# Patient Record
Sex: MALE
[De-identification: names, ages, dates, MRNs, and addresses within clinical notes are randomized per-mention and may not be internally consistent; named-entity substitution may affect disease eponyms.]

---

## 2022-07-29 ENCOUNTER — HOSPITAL ENCOUNTER (EMERGENCY)
Dept: HOSPITAL 46 - ED | Age: 32
LOS: 1 days | Discharge: HOME | End: 2022-07-30
Payer: SELF-PAY

## 2022-07-29 VITALS — BODY MASS INDEX: 34.53 KG/M2 | HEIGHT: 67 IN | WEIGHT: 220 LBS

## 2022-07-29 DIAGNOSIS — Z20.822: ICD-10-CM

## 2022-07-29 DIAGNOSIS — Z00.8: Primary | ICD-10-CM

## 2022-07-29 PROCEDURE — U0003 INFECTIOUS AGENT DETECTION BY NUCLEIC ACID (DNA OR RNA); SEVERE ACUTE RESPIRATORY SYNDROME CORONAVIRUS 2 (SARS-COV-2) (CORONAVIRUS DISEASE [COVID-19]), AMPLIFIED PROBE TECHNIQUE, MAKING USE OF HIGH THROUGHPUT TECHNOLOGIES AS DESCRIBED BY CMS-2020-01-R: HCPCS

## 2022-07-29 PROCEDURE — G0480 DRUG TEST DEF 1-7 CLASSES: HCPCS

## 2022-07-29 PROCEDURE — C9803 HOPD COVID-19 SPEC COLLECT: HCPCS

## 2022-07-29 NOTE — XMS
PreManage Notification: SUZE THOMPSON MRN:[id number]
 
Security Information
 
Security Events
No recent Security Events currently on file
 
 
 
CRITERIA MET
------------
- [facility] - 2 Visits in 30 Days
 
 
CARE PROVIDERS
There are no care providers on record at this time.
 
[facility] has no Care Guidelines for this patient.
 
[facility] V[facility] (12 MO.)
-------------------------------------------------------------------------------------
[facility]
-------------------------------------------------------------------------------------
TOTAL 2
-------------------------------------------------------------------------------------
NOTE: Visits indicate total known visits.
 
ED/C VISIT TRACKING (12 MO.)
-------------------------------------------------------------------------------------
07/29/2022 11:17
 [facility] [place]
 
TYPE: Emergency
 
COMPLAINT:
- MEDICAL CLEARANCE
-------------------------------------------------------------------------------------
07/27/2022 12:09
[facility] [place]
 
TYPE: Emergency
 
COMPLAINT:
- MEDICAL CLEARANCE
 
DIAGNOSES:
- Other psychoactive substance use, unspecified with psychoactive substance-induced
psychotic disorder with hallucinations
- Disorder of kidney and ureter, unspecified
- Auditory hallucinations
-------------------------------------------------------------------------------------
 
 
INPATIENT VISIT TRACKING (12 MO.)
No inpatient visits to display in this time frame
 
[email or web address]